# Patient Record
Sex: FEMALE | Race: OTHER
[De-identification: names, ages, dates, MRNs, and addresses within clinical notes are randomized per-mention and may not be internally consistent; named-entity substitution may affect disease eponyms.]

---

## 2018-10-14 ENCOUNTER — HOSPITAL ENCOUNTER (EMERGENCY)
Dept: HOSPITAL 25 - ED | Age: 63
Discharge: HOME | End: 2018-10-14
Payer: SELF-PAY

## 2018-10-14 VITALS — DIASTOLIC BLOOD PRESSURE: 69 MMHG | SYSTOLIC BLOOD PRESSURE: 104 MMHG

## 2018-10-14 DIAGNOSIS — M54.6: Primary | ICD-10-CM

## 2018-10-14 DIAGNOSIS — R07.89: ICD-10-CM

## 2018-10-14 DIAGNOSIS — R61: ICD-10-CM

## 2018-10-14 DIAGNOSIS — I10: ICD-10-CM

## 2018-10-14 DIAGNOSIS — R11.0: ICD-10-CM

## 2018-10-14 LAB
BASOPHILS # BLD AUTO: 0 10^3/UL (ref 0–0.2)
EOSINOPHIL # BLD AUTO: 0 10^3/UL (ref 0–0.6)
HCT VFR BLD AUTO: 41 % (ref 35–47)
HGB BLD-MCNC: 13.8 G/DL (ref 12–16)
INR PPP/BLD: 0.89 (ref 0.77–1.02)
LYMPHOCYTES # BLD AUTO: 1.6 10^3/UL (ref 1–4.8)
MCH RBC QN AUTO: 30 PG (ref 27–31)
MCHC RBC AUTO-ENTMCNC: 34 G/DL (ref 31–36)
MCV RBC AUTO: 88 FL (ref 80–97)
MONOCYTES # BLD AUTO: 0.4 10^3/UL (ref 0–0.8)
NEUTROPHILS # BLD AUTO: 5.9 10^3/UL (ref 1.5–7.7)
NRBC # BLD AUTO: 0 10^3/UL
NRBC BLD QL AUTO: 0.1
PLATELET # BLD AUTO: 250 10^3/UL (ref 150–450)
RBC # BLD AUTO: 4.64 10^6/UL (ref 4–5.4)
WBC # BLD AUTO: 7.9 10^3/UL (ref 3.5–10.8)

## 2018-10-14 PROCEDURE — 36415 COLL VENOUS BLD VENIPUNCTURE: CPT

## 2018-10-14 PROCEDURE — 74177 CT ABD & PELVIS W/CONTRAST: CPT

## 2018-10-14 PROCEDURE — 85610 PROTHROMBIN TIME: CPT

## 2018-10-14 PROCEDURE — 83735 ASSAY OF MAGNESIUM: CPT

## 2018-10-14 PROCEDURE — 71045 X-RAY EXAM CHEST 1 VIEW: CPT

## 2018-10-14 PROCEDURE — 83880 ASSAY OF NATRIURETIC PEPTIDE: CPT

## 2018-10-14 PROCEDURE — 82553 CREATINE MB FRACTION: CPT

## 2018-10-14 PROCEDURE — 99283 EMERGENCY DEPT VISIT LOW MDM: CPT

## 2018-10-14 PROCEDURE — 85025 COMPLETE CBC W/AUTO DIFF WBC: CPT

## 2018-10-14 PROCEDURE — 85730 THROMBOPLASTIN TIME PARTIAL: CPT

## 2018-10-14 PROCEDURE — 96374 THER/PROPH/DIAG INJ IV PUSH: CPT

## 2018-10-14 PROCEDURE — 71275 CT ANGIOGRAPHY CHEST: CPT

## 2018-10-14 PROCEDURE — 80053 COMPREHEN METABOLIC PANEL: CPT

## 2018-10-14 PROCEDURE — 84484 ASSAY OF TROPONIN QUANT: CPT

## 2018-10-14 PROCEDURE — 93005 ELECTROCARDIOGRAM TRACING: CPT

## 2018-10-14 PROCEDURE — 82550 ASSAY OF CK (CPK): CPT

## 2018-10-14 RX ADMIN — ONDANSETRON ONE MG: 2 INJECTION INTRAMUSCULAR; INTRAVENOUS at 03:01

## 2018-10-14 RX ADMIN — ONDANSETRON ONE: 2 INJECTION INTRAMUSCULAR; INTRAVENOUS at 03:08

## 2018-10-14 RX ADMIN — MORPHINE SULFATE ONE: 4 INJECTION, SOLUTION INTRAMUSCULAR; INTRAVENOUS at 03:08

## 2018-10-14 RX ADMIN — MORPHINE SULFATE ONE MG: 4 INJECTION, SOLUTION INTRAMUSCULAR; INTRAVENOUS at 03:00

## 2018-10-14 NOTE — RAD
EXAM:

  CT Angiography Chest With Intravenous Contrast



CLINICAL HISTORY:

  62 years old, female; Pain; Chest pain; Abdominal pain; Generalized; 

Additional info: Disection/pe



TECHNIQUE:

  Axial computed tomographic angiography images of the chest with intravenous 

contrast using pulmonary embolism protocol.  All CT scans at this facility use 

at least one of these dose optimization techniques: automated exposure control; 

mA and/or kV adjustment per patient size (includes targeted exams where dose is 

matched to clinical indication); or iterative reconstruction.

  MIP reconstructed images were created and reviewed.

  Coronal and sagittal reformatted images were created and reviewed.



CONTRAST:

  94 mL of OMNI 350 administered intravenously.  



COMPARISON:

  No relevant prior studies available.



FINDINGS:

  Pulmonary arteries:  No pulmonary embolic disease.

  Aorta:  No evidence of a thoracic aortic dissection or aneurysm.

Lungs:  No pulmonary consolidation.

  Pleural space:  No pneumothorax or pleural effusion.

  Heart:  Cardiac size is normal. No pericardial thickening or effusion.  No 

evidence of RV dysfunction.

  Bones/joints:  No rib fracture. Sclerosis of the mid right seventh rib. No 

bony destruction. The thoracic spine is intact.  No dislocation.

  Soft tissues:  Unremarkable.

  Lymph nodes:  No mediastinal adenopathy.



IMPRESSION:     

  No acute findings.





_______________________________________________



EXAM:

  CT Angiography Chest With Intravenous Contrast

  CT Angiography Abdomen and Pelvis With Intravenous Contrast



CLINICAL HISTORY:

  62 years old, female; Pain; Chest pain; Abdominal pain; Generalized; 

Additional info: Disection/pe



TECHNIQUE:

  Axial computed tomographic angiography images of the chest, abdomen and 

pelvis with intravenous contrast using CT angiography protocol.  All CT scans 

at this facility use at least one of these dose optimization techniques: 

automated exposure control; mA and/or kV adjustment per patient size (includes 

targeted exams where dose is matched to clinical indication); or iterative 

reconstruction.

  MIP reconstructed images were created and reviewed.

  Coronal and sagittal reformatted images were created and reviewed.



CONTRAST:

  94 mL of OMNI 350 administered intravenously.  94 mL of OMNI 350 administered 

intravenously.  



COMPARISON:

  DX CXR CHEST 2 VWS 2/14/2012 10:09 AM



FINDINGS:



 VASCULATURE:

  Aorta:  Consultation of the wall of the abdominal aorta. No aneurysmal 

dilatation.  No dissection.

  Pulmonary arteries:  Unremarkable as visualized.  No pulmonary embolism is 

identified.

  Great vessels of aortic arch:  No acute findings.  No dissection.  No 

arterial occlusion or significant stenosis.

  Celiac trunk and mesenteric arteries:  No acute findings.  No occlusion or 

significant stenosis.

  Renal arteries:  No acute findings.  No occlusion or significant stenosis.

  Iliac arteries:  No acute findings.  No occlusion or significant stenosis.



 CHEST:

  Lungs:  Unremarkable.  No mass.  No consolidation.

  Pleural space:  Unremarkable.  No significant effusion.  No pneumothorax.

  Heart:  Unremarkable.  No cardiomegaly.  No significant pericardial effusion.



 ABDOMEN:

  Liver:  The liver is of normal size and appearance. No focal abnormality.

  Gallbladder and bile ducts:  Unremarkable.  No calcified stones.  No ductal 

dilation.

  Pancreas:  Unremarkable.  No ductal dilation.  No mass.

  Spleen:  Unremarkable.  No splenomegaly.

  Adrenals:  Unremarkable.  No mass.

  Kidneys and ureters:  The kidneys are of normal size and appearance. No 

hydronephrosis or nephrolithiasis.  No solid mass.

  Stomach and bowel:  No bowel obstruction. No bowel wall thickening.



 PELVIS:

  Appendix:  No findings to suggest acute appendicitis.

  Bladder:  Unremarkable.  No mass.

  Reproductive:  Unremarkable as visualized.



 CHEST, ABDOMEN and PELVIS:

  Intraperitoneal space:  Unremarkable.  No significant fluid collection.  No 

free air.

  Bones/joints:  No acute fracture.  No dislocation.

  Soft tissues:  Unremarkable.

  Lymph nodes:  Unremarkable.  No enlarged lymph nodes.



IMPRESSION:     

  No acute findings.



To contact [facility] with a general question: [facility] - [phone]

For direct physician to physician contact: Physician Hotline - [phone]

[facility] ([facility] Facility ID #[id number])

## 2018-10-14 NOTE — ED
HPI Chest Pain





- HPI Summary


HPI Summary: 


Pt is 63 y/o F who presents to ED c/o chest burning and upper left back pain 

since 22:00. Symptoms began at 22:00 but worsened at 00:00 Pt is very clear 

that the sensation in her chest is not pain, but a burning feeling. The back 

pain is described as sharp. Rates her pain as 6/10 in severity. Notes sweating 

and nausea. Pt is on medication for blood pressure. 





- History of Current Complaint


Chief Complaint: EDChestPainROMI


Time Seen by Provider: 10/14/18 02:10


Hx Obtained From: Patient


Onset/Duration: Started Hours Ago, Still Present, Worse Since - midnight


Time of Onset: 22:00


Timing: Lasting Hours


Initial Severity: Mild


Current Severity: Moderate


Pain Intensity: 6


Pain Scale Used: 0-10 Numeric


Chest Pain Radiates: Yes


Chest Pain Radiates To:: Back


Character: Burning


Associated Signs and Symptoms: Positive: Chest Pain, Diaphoresis, Nausea, Back 

Pain





- Allergy/Home Medications


Allergies/Adverse Reactions: 


 Allergies











Allergy/AdvReac Type Severity Reaction Status Date / Time


 


No Known Allergies Allergy   Verified 10/14/18 01:58














PMH/Surg Hx/FS Hx/Imm Hx


Endocrine/Hematology History: 


   Denies: Hx Diabetes, Hx Thyroid Disease, Hx Anemia


Cardiovascular History: 


   Denies: Hx Hypertension


Respiratory History: 


   Denies: Hx Asthma, Hx Chronic Obstructive Pulmonary Disease (COPD)


Musculoskeletal History: 


   Denies: Hx Arthritis


Psychiatric History: 


   Denies: Hx Anxiety, Hx Depression





- Cancer History


Hx Chemotherapy: No


Hx Radiation Therapy: No


Infectious Disease History: No


Infectious Disease History: 


   Denies: Traveled Outside the [place] in Last 30 Days





- Family History


Known Family History: 


   Negative: Hypertension, Diabetes, Blood Disorder





- Social History


Alcohol Use: None


Hx Substance Use: No


Substance Use Type: Reports: None


Smoking Status (MU): Never Smoked Tobacco


Have You Smoked in the Last Year: No





Review of Systems


Positive: Skin Diaphoresis


Positive: Chest Pain


Positive: Abdominal Pain, Nausea


Positive: Other - Upper left back pain


All Other Systems Reviewed And Are Negative: Yes





Physical Exam





- Summary


Physical Exam Summary: 





VITAL SIGNS: Reviewed.


GENERAL: Patient is a well-developed and nourished female who is lying 

comfortable in the stretcher. Patient is not in any acute respiratory distress.


HEAD AND FACE: No signs of trauma. No ecchymosis, hematomas or skull 

depressions. No sinus tenderness.


EYES: PERRLA, EOMI x 2, No injected conjunctiva, no nystagmus.


EARS: Hearing grossly intact. Ear canals and tympanic membranes are within 

normal limits.


MOUTH: Oropharynx within normal limits.


NECK: Supple, trachea is midline, no adenopathy, no JVD, no carotid bruit, no c-

spine tenderness, neck with full ROM.


CHEST: Symmetric, no tenderness at palpation


LUNGS: Clear to auscultation bilaterally. No wheezing or crackles.


CVS: Regular rate and rhythm, S1 and S2 present, no murmurs or gallops 

appreciated


ABDOMEN: Soft, non-tender. No signs of distention. No rebound no guarding, and 

no masses palpated. Bowel sounds are normal.


EXTREMITIES: FROM in all major joints, no edema, no cyanosis or clubbing.


NEURO: Alert and oriented x 3. No acute neurological deficits. Speech is normal 

and follows commands.


SKIN: Dry and warm


Triage Information Reviewed: Yes


Vital Signs On Initial Exam: 


 Initial Vitals











Temp Pulse Resp BP Pulse Ox


 


 98.1 F   89   20   169/86   100 


 


 10/14/18 01:53  10/14/18 01:53  10/14/18 01:53  10/14/18 01:53  10/14/18 01:53











Vital Signs Reviewed: Yes





Diagnostics





- Vital Signs


 Vital Signs











  Temp Pulse Resp BP Pulse Ox


 


 10/14/18 04:06   56  19  121/67  97


 


 10/14/18 04:00   60  15   96


 


 10/14/18 03:36   59  14  118/65  96


 


 10/14/18 03:06   59  24  125/67  95


 


 10/14/18 03:00   63  17   96


 


 10/14/18 02:49      97


 


 10/14/18 02:42   65  20  173/77  96


 


 10/14/18 02:12   66  22  187/82  95


 


 10/14/18 02:11   80  17   96


 


 10/14/18 01:53  98.1 F  89  20  169/86  100














- Laboratory


Lab Results: 


 Lab Results











  10/14/18 10/14/18 10/14/18 Range/Units





  03:11 03:11 03:11 


 


WBC  7.9    (3.5-10.8)  10^3/ul


 


RBC  4.64    (4.00-5.40)  10^6/ul


 


Hgb  13.8    (12.0-16.0)  g/dl


 


Hct  41    (35-47)  %


 


MCV  88    (80-97)  fL


 


MCH  30    (27-31)  pg


 


MCHC  34    (31-36)  g/dl


 


RDW  14    (10.5-15)  %


 


Plt Count  250    (150-450)  10^3/ul


 


MPV  9.1    (7.4-10.4)  um3


 


Neut % (Auto)  74.2    (38-83)  %


 


Lymph % (Auto)  20.4 L    (25-47)  %


 


Mono % (Auto)  4.8    (0-7)  %


 


Eos % (Auto)  0.4    (0-6)  %


 


Baso % (Auto)  0.2    (0-2)  %


 


Absolute Neuts (auto)  5.9    (1.5-7.7)  10^3/ul


 


Absolute Lymphs (auto)  1.6    (1.0-4.8)  10^3/ul


 


Absolute Monos (auto)  0.4    (0-0.8)  10^3/ul


 


Absolute Eos (auto)  0    (0-0.6)  10^3/ul


 


Absolute Basos (auto)  0    (0-0.2)  10^3/ul


 


Absolute Nucleated RBC  0    10^3/ul


 


Nucleated RBC %  0.1    


 


INR (Anticoag Therapy)   0.89   (0.77-1.02)  


 


APTT   32.9   (26.0-36.3)  seconds


 


Sodium    141  (135-145)  mmol/L


 


Potassium    3.8  (3.5-5.0)  mmol/L


 


Chloride    108  (101-111)  mmol/L


 


Carbon Dioxide    26  (22-32)  mmol/L


 


Anion Gap    7  (2-11)  mmol/L


 


BUN    16  (6-24)  mg/dL


 


Creatinine    0.70  (0.51-0.95)  mg/dL


 


Est GFR ( Amer)    102.9  (>60)  


 


Est GFR (Non-Af Amer)    85.1  (>60)  


 


BUN/Creatinine Ratio    22.9 H  (8-20)  


 


Glucose    125 H  ()  mg/dL


 


Calcium    9.7  (8.6-10.3)  mg/dL


 


Magnesium    1.9  (1.9-2.7)  mg/dL


 


Total Bilirubin    0.40  (0.2-1.0)  mg/dL


 


AST    18  (13-39)  U/L


 


ALT    18  (7-52)  U/L


 


Alkaline Phosphatase    57  ()  U/L


 


Total Creatine Kinase    113  ()  U/L


 


CK-MB (CK-2)    1.6  (0.6-6.3)  ng/mL


 


Troponin I    0.00  (<0.04)  ng/mL


 


B-Natriuretic Peptide    ( - 100) pg/mL


 


Total Protein    6.5  (6.4-8.9)  g/dL


 


Albumin    4.3  (3.2-5.2)  g/dL


 


Globulin    2.2  (2-4)  g/dL


 


Albumin/Globulin Ratio    2.0  (1-3)  














  10/14/18 Range/Units





  03:11 


 


WBC   (3.5-10.8)  10^3/ul


 


RBC   (4.00-5.40)  10^6/ul


 


Hgb   (12.0-16.0)  g/dl


 


Hct   (35-47)  %


 


MCV   (80-97)  fL


 


MCH   (27-31)  pg


 


MCHC   (31-36)  g/dl


 


RDW   (10.5-15)  %


 


Plt Count   (150-450)  10^3/ul


 


MPV   (7.4-10.4)  um3


 


Neut % (Auto)   (38-83)  %


 


Lymph % (Auto)   (25-47)  %


 


Mono % (Auto)   (0-7)  %


 


Eos % (Auto)   (0-6)  %


 


Baso % (Auto)   (0-2)  %


 


Absolute Neuts (auto)   (1.5-7.7)  10^3/ul


 


Absolute Lymphs (auto)   (1.0-4.8)  10^3/ul


 


Absolute Monos (auto)   (0-0.8)  10^3/ul


 


Absolute Eos (auto)   (0-0.6)  10^3/ul


 


Absolute Basos (auto)   (0-0.2)  10^3/ul


 


Absolute Nucleated RBC   10^3/ul


 


Nucleated RBC %   


 


INR (Anticoag Therapy)   (0.77-1.02)  


 


APTT   (26.0-36.3)  seconds


 


Sodium   (135-145)  mmol/L


 


Potassium   (3.5-5.0)  mmol/L


 


Chloride   (101-111)  mmol/L


 


Carbon Dioxide   (22-32)  mmol/L


 


Anion Gap   (2-11)  mmol/L


 


BUN   (6-24)  mg/dL


 


Creatinine   (0.51-0.95)  mg/dL


 


Est GFR ( Amer)   (>60)  


 


Est GFR (Non-Af Amer)   (>60)  


 


BUN/Creatinine Ratio   (8-20)  


 


Glucose   ()  mg/dL


 


Calcium   (8.6-10.3)  mg/dL


 


Magnesium   (1.9-2.7)  mg/dL


 


Total Bilirubin   (0.2-1.0)  mg/dL


 


AST   (13-39)  U/L


 


ALT   (7-52)  U/L


 


Alkaline Phosphatase   ()  U/L


 


Total Creatine Kinase   ()  U/L


 


CK-MB (CK-2)   (0.6-6.3)  ng/mL


 


Troponin I   (<0.04)  ng/mL


 


B-Natriuretic Peptide  64 ( - 100) pg/mL


 


Total Protein   (6.4-8.9)  g/dL


 


Albumin   (3.2-5.2)  g/dL


 


Globulin   (2-4)  g/dL


 


Albumin/Globulin Ratio   (1-3)  











Result Diagrams: 


 10/14/18 03:11





 10/14/18 03:11


Lab Statement: Any lab studies that have been ordered have been reviewed, and 

results considered in the medical decision making process.





- Radiology


  ** CXR


Radiology Interpretation Completed By: ED Physician - No acute processes, 

pending official report.





- CT


  ** Chest/Abd/Pel CTA


CT Interpretation Completed By: Radiologist - No acute findings. ED Physician 

reviewed this report.





- EKG


  ** 02:04


Cardiac Rate: NL - 83 bpm


EKG Rhythm: Sinus Rhythm


EKG Interpretation: Low voltage





Chest Pain Course/Dx





- Course


Course Of Treatment: Pt is 63 y/o F who presents to ED c/o chest burning and 

upper left back pain since 22:00. Symptoms began at 22:00 but worsened at 00:00 

Pt is very clear that the sensation in her chest is not pain, but a burning 

feeling. The back pain is described as sharp. Rates her pain as 6/10 in 

severity. Notes sweating and nausea. Pt is on medication for blood pressure. 

Physical exam was normal. EKG taken at 02:04 showed sinus at 83 bpm with low 

voltage. CXR showed no acute processes. Chest CTA showed no acute findings. Her 

heart score was 2 and her second troponin was negative.  Pt discharged home 

with dx of hypertension and back pain. She was told to follow up with 

cardiologist and is agreeable with this plan.





- Diagnoses


Provider Diagnoses: 


 Hypertension, Back pain








Discharge





- Sign-Out/Discharge


Documenting (check all that apply): Patient Departure - Discharge





- Discharge Plan


Condition: Stable


Disposition: HOME


Prescriptions: 


Metoprolol Succinate XL TAB* [Toprol XL TAB*] 25 mg PO DAILY #30 tab.xl


oxyCODONE/Acetamin 5/325 MG* [Percocet 5/325 TAB*] 1 tab PO Q6H PRN #14 tab MDD 

4


 PRN Reason: Pain


Patient Education Materials:  Hypertension (ED)


Referrals: 


Yonathan Osman MD [Medical Doctor] - 2 Days


Additional Instructions: 


RETURN TO THE EMERGENCY DEPARTMENT FOR CHANGING OR WORSENING SYMPTOMS. FOLLOW 

UP WITH PCP IN 1-2 DAYS.





- Attestation Statements


Document Initiated by Scribe: Yes


Documenting Scribe: Serg Tay


Provider For Whom Scribe is Documenting (Include Credential): Dr. Yelena Ernst MD


Scribe Attestation: 


Serg PERKINS, scribed for Dr. Yelena Ernst MD on 10/14/18 at 0728.

## 2018-10-14 NOTE — RAD
Indication: Chest pain.



Single frontal view of the chest performed at 0335 hours was reviewed.



Comparison is made with previous exam dated February 14, 2012.



No mediastinal shift is noted. Heart is of normal size and configuration. Lung fields

appear clear.



IMPRESSION: NO ACTIVE CARDIOPULMONARY DISEASE IS NOTED.



R0